# Patient Record
Sex: FEMALE | Race: WHITE
[De-identification: names, ages, dates, MRNs, and addresses within clinical notes are randomized per-mention and may not be internally consistent; named-entity substitution may affect disease eponyms.]

---

## 2017-03-30 ENCOUNTER — HOSPITAL ENCOUNTER (EMERGENCY)
Dept: HOSPITAL 47 - EC | Age: 22
Discharge: HOME | End: 2017-03-30
Payer: COMMERCIAL

## 2017-03-30 VITALS
HEART RATE: 84 BPM | RESPIRATION RATE: 18 BRPM | DIASTOLIC BLOOD PRESSURE: 64 MMHG | SYSTOLIC BLOOD PRESSURE: 122 MMHG | TEMPERATURE: 97 F

## 2017-03-30 DIAGNOSIS — Z88.5: ICD-10-CM

## 2017-03-30 DIAGNOSIS — Z3A.00: ICD-10-CM

## 2017-03-30 DIAGNOSIS — O23.40: Primary | ICD-10-CM

## 2017-03-30 DIAGNOSIS — F17.200: ICD-10-CM

## 2017-03-30 DIAGNOSIS — O99.89: ICD-10-CM

## 2017-03-30 DIAGNOSIS — R11.0: ICD-10-CM

## 2017-03-30 DIAGNOSIS — Z88.0: ICD-10-CM

## 2017-03-30 DIAGNOSIS — N89.8: ICD-10-CM

## 2017-03-30 DIAGNOSIS — O99.330: ICD-10-CM

## 2017-03-30 LAB
PARTICLE COUNT: (no result)
PH UR: 5.5 [PH] (ref 5–8)
PROT UR QL: (no result)
RBC UR QL: 3 /HPF (ref 0–5)
SP GR UR: 1.02 (ref 1–1.03)
SQUAMOUS UR QL AUTO: 101 /HPF (ref 0–4)
UA BILLING (MACRO VS. MICRO): (no result)
UROBILINOGEN UR QL STRIP: <2 MG/DL (ref ?–2)
WBC #/AREA URNS HPF: 176 /HPF (ref 0–5)

## 2017-03-30 PROCEDURE — 87205 SMEAR GRAM STAIN: CPT

## 2017-03-30 PROCEDURE — 87070 CULTURE OTHR SPECIMN AEROBIC: CPT

## 2017-03-30 PROCEDURE — 81001 URINALYSIS AUTO W/SCOPE: CPT

## 2017-03-30 PROCEDURE — 87086 URINE CULTURE/COLONY COUNT: CPT

## 2017-03-30 PROCEDURE — 87591 N.GONORRHOEAE DNA AMP PROB: CPT

## 2017-03-30 PROCEDURE — 87808 TRICHOMONAS ASSAY W/OPTIC: CPT

## 2017-03-30 PROCEDURE — 99284 EMERGENCY DEPT VISIT MOD MDM: CPT

## 2017-03-30 PROCEDURE — 86901 BLOOD TYPING SEROLOGIC RH(D): CPT

## 2017-03-30 PROCEDURE — 84702 CHORIONIC GONADOTROPIN TEST: CPT

## 2017-03-30 PROCEDURE — 87491 CHLMYD TRACH DNA AMP PROBE: CPT

## 2017-03-30 PROCEDURE — 36415 COLL VENOUS BLD VENIPUNCTURE: CPT

## 2017-03-30 PROCEDURE — 81025 URINE PREGNANCY TEST: CPT

## 2017-03-30 PROCEDURE — 86900 BLOOD TYPING SEROLOGIC ABO: CPT

## 2017-03-30 NOTE — ED
Female Urogenital HPI





- General


Chief complaint: Vaginal Bleeding


Stated complaint: pregnant-spotting


Time Seen by Provider: 03/30/17 01:43


Source: patient, RN notes reviewed


Mode of arrival: ambulatory


Limitations: no limitations





- History of Present Illness


Initial comments: 





Patient is 21-year-old female chief complaint of possibly being pregnant.  

Patient reports that over the past increasing nausea in the morning as well as 

tenderness to bilateral breasts.  Patient reports that she's not had a 

menstrual cycle since November.  Patient states that she has a history of 

abnormal.  Said this is a surprise to her.  She states that she is unsure of 

her lung pregnant she could be.  She denies any abdominal pain.  She states 

that today she did notice spotting and is concerned.  Patient states she has 

not taken a pregnancy test.  Patient states the severe first pregnancy.


Last Menstrual Period: 11/30/16





- Related Data


 Previous Rx's











 Medication  Instructions  Recorded


 


Cefpodoxime Proxetil [Vantin] 100 mg PO Q12HR #14 tab 03/30/17


 


Prenatal Vit-Iron-Folic Acid 1 cap PO DAILY #30 cap 03/30/17





[Prenatal-U Capsule (formulary)]  











 Allergies











Allergy/AdvReac Type Severity Reaction Status Date / Time


 


codeine Allergy  Anaphylaxis Verified 03/30/17 01:44


 


Penicillins Allergy  Rash/Hives Verified 03/30/17 01:44














Review of Systems


ROS Statement: 


Those systems with pertinent positive or pertinent negative responses have been 

documented in the HPI.





ROS Other: All systems not noted in ROS Statement are negative.





Past Medical History


Additional Past Medical History / Comment(s): endometreosis


History of Any Multi-Drug Resistant Organisms: None Reported


Past Surgical History: Hernia Repair


Past Psychological History: Anxiety, Bipolar


Smoking Status: Current every day smoker


Past Alcohol Use History: None Reported


Past Drug Use History: None Reported





General Exam





- General Exam Comments


Initial Comments: 





Pleasant 21-year-old female.  No distress.


Limitations: no limitations


General appearance: alert, in no apparent distress


Head exam: Present: atraumatic, normocephalic, normal inspection


Eye exam: Present: normal appearance, PERRL, EOMI.  Absent: scleral icterus, 

conjunctival injection, periorbital swelling


ENT exam: Present: normal exam, mucous membranes moist


Neck exam: Present: normal inspection.  Absent: tenderness, meningismus, 

lymphadenopathy


Respiratory exam: Present: normal lung sounds bilaterally.  Absent: respiratory 

distress, wheezes, rales, rhonchi, stridor


Cardiovascular Exam: Present: regular rate, normal rhythm, normal heart sounds.

  Absent: systolic murmur, diastolic murmur, rubs, gallop, clicks


GI/Abdominal exam: Present: soft, normal bowel sounds.  Absent: distended, 

tenderness, guarding, rebound, rigid


External exam: Present: normal external exam


Speculum exam: Present: normal speculum exam, vaginal discharge (Skin vaginal 

discharge.)


By manual exam: Present: normal by manual exam


Extremities exam: Present: normal inspection, full ROM, normal capillary 

refill.  Absent: tenderness, pedal edema, joint swelling, calf tenderness


Back exam: Present: normal inspection


Neurological exam: Present: alert, oriented X3, CN II-XII intact





Course


 Vital Signs











  03/30/17 03/30/17





  01:39 03:33


 


Temperature 97.4 F L 97 F L


 


Pulse Rate 74 84


 


Respiratory 20 18





Rate  


 


Blood Pressure 120/78 122/64


 


O2 Sat by Pulse 99 97





Oximetry  














Medical Decision Making





- Medical Decision Making





Patient is 21-year-old female the possibility of being pregnant.  Last 

menstrual cycle was in November.  Patient does test positive on the urine 

pregnancy test.  Also signs of infection.  Patient be treated with Ceftin 

piroxicam.  She is ALLERGIC to penicillins.  Patient is also O+.  No signs of 

miscarriage with any vaginal bleeding.  There is scant amount of vaginal 

discharge.  Swabs are obtained for chlamydia and gonorrhea.  Patient is excited 

about the pregnancy.  She states that she plans to get an OB/GYN.  At this time 

currently pending hCG Quant results.  Patient will be discharged with prenatal 

vitamin, antibiotic, and prescription for transvaginal ultrasound.  Patient 

agrees with the treatment plan will comply.  I discussed close follow-up with 

primary care provider.  Return parameters were discussed.





- Lab Data


 Lab Results











  03/30/17 03/30/17 03/30/17 Range/Units





  01:50 01:50 02:35 


 


Urine Color   Yellow   


 


Urine Appearance   Turbid H   (Clear)  


 


Urine pH   5.5   (5.0-8.0)  


 


Ur Specific Gravity   1.023   (1.001-1.035)  


 


Urine Protein   1+ H   (Negative)  


 


Urine Glucose (UA)   Negative   (Negative)  


 


Urine Ketones   Negative   (Negative)  


 


Urine Blood   Moderate H   (Negative)  


 


Urine Nitrite   Negative   (Negative)  


 


Urine Bilirubin   Negative   (Negative)  


 


Urine Urobilinogen   <2.0   (<2.0)  mg/dL


 


Ur Leukocyte Esterase   Large H   (Negative)  


 


Urine RBC   3   (0-5)  /hpf


 


Urine WBC   176 H   (0-5)  /hpf


 


Ur Squamous Epith Cells   101 H   (0-4)  /hpf


 


Urine Mucus   Many H   (None)  /hpf


 


Urine HCG, Qual  Detected    (Not Detectd)  


 


Trichomonas Ag (Rapid)    Negative  (Negative)  


 


Blood Type     


 


Blood Type Recheck     














  03/30/17 Range/Units





  03:30 


 


Urine Color   


 


Urine Appearance   (Clear)  


 


Urine pH   (5.0-8.0)  


 


Ur Specific Gravity   (1.001-1.035)  


 


Urine Protein   (Negative)  


 


Urine Glucose (UA)   (Negative)  


 


Urine Ketones   (Negative)  


 


Urine Blood   (Negative)  


 


Urine Nitrite   (Negative)  


 


Urine Bilirubin   (Negative)  


 


Urine Urobilinogen   (<2.0)  mg/dL


 


Ur Leukocyte Esterase   (Negative)  


 


Urine RBC   (0-5)  /hpf


 


Urine WBC   (0-5)  /hpf


 


Ur Squamous Epith Cells   (0-4)  /hpf


 


Urine Mucus   (None)  /hpf


 


Urine HCG, Qual   (Not Detectd)  


 


Trichomonas Ag (Rapid)   (Negative)  


 


Blood Type  O Positive  


 


Blood Type Recheck  No  














Disposition


Clinical Impression: 


 Pregnant, Urinary tract infection





Disposition: HOME SELF-CARE


Condition: Good


Instructions:  Pregnancy (ED), Urinary Tract Infection in Pregnancy (ED)


Additional Instructions: 


Patient denies to complete antibiotics.  Follow-up with a OB/GYN.  Completely 

ultrasound.  Return to emergency department if any alarming signs or symptoms 

occur.


Prescriptions: 


Cefpodoxime Proxetil [Vantin] 100 mg PO Q12HR #14 tab


Prenatal Vit-Iron-Folic Acid [Prenatal-U Capsule (formulary)] 1 cap PO DAILY #

30 cap


Referrals: 


Alejandro Richardson MD [Primary Care Provider] - 1-2 days


Helen Villalobos MD [STAFF PHYSICIAN] - 1-2 days


Time of Disposition: 03:11

## 2017-08-12 ENCOUNTER — HOSPITAL ENCOUNTER (OUTPATIENT)
Dept: HOSPITAL 47 - FBPOP | Age: 22
Discharge: HOME | End: 2017-08-12
Payer: COMMERCIAL

## 2017-08-12 VITALS
RESPIRATION RATE: 20 BRPM | TEMPERATURE: 97.1 F | SYSTOLIC BLOOD PRESSURE: 118 MMHG | DIASTOLIC BLOOD PRESSURE: 71 MMHG | HEART RATE: 82 BPM

## 2017-08-12 DIAGNOSIS — O21.9: Primary | ICD-10-CM

## 2017-08-12 DIAGNOSIS — Z3A.26: ICD-10-CM

## 2017-08-12 LAB
PARTICLE COUNT: (no result)
PH UR: 6.5 [PH] (ref 5–8)
RBC UR QL: 4 /HPF (ref 0–5)
SP GR UR: 1.01 (ref 1–1.03)
SQUAMOUS UR QL AUTO: 27 /HPF (ref 0–4)
UA BILLING (MACRO VS. MICRO): (no result)
UROBILINOGEN UR QL STRIP: <2 MG/DL (ref ?–2)
WBC #/AREA URNS HPF: 13 /HPF (ref 0–5)

## 2017-08-12 PROCEDURE — 81001 URINALYSIS AUTO W/SCOPE: CPT

## 2017-08-12 PROCEDURE — 96375 TX/PRO/DX INJ NEW DRUG ADDON: CPT

## 2017-08-12 PROCEDURE — 96360 HYDRATION IV INFUSION INIT: CPT

## 2017-08-12 PROCEDURE — 99214 OFFICE O/P EST MOD 30 MIN: CPT

## 2017-08-12 PROCEDURE — 96361 HYDRATE IV INFUSION ADD-ON: CPT

## 2017-08-12 RX ADMIN — POTASSIUM CHLORIDE SCH MLS/HR: 14.9 INJECTION, SOLUTION INTRAVENOUS at 11:26

## 2017-08-12 RX ADMIN — POTASSIUM CHLORIDE SCH MLS/HR: 14.9 INJECTION, SOLUTION INTRAVENOUS at 10:50

## 2017-10-28 NOTE — P.MSEPDOC
Presenting Problems





- Arrival Data


Date of Arrival on Unit: 17


Time of Arrival on Unit: 09:39


Mode of Transport: Ambulatory





- Complaint


OB-Reason for Admission/Chief Complaint: Acute Nausea/Vomiting


Comment: n/v x 7 since midnight.





Prenatal Medical History





- Pregnancy Information


: 1


Para: 0


Term: 0


: 0


Abortions: Spontaneous or Elective: 0


Number of Living Children: 0





- Gestational Age


Gestational Age by AIDAN (wks/days): 26 Weeks and 3 Days





Review of Systems





- Review of Systems


Constitutional: No problems


Breast: No problems


ENT: No problems


Cardiovascular: No problems


Respiratory: No problems


Genitourinary: No problems


Musculoskeletal: No problems


Neurological: No problems


Skin: No problems


Comment: n/v since mn





Vital Signs





- Temperature


Temperature: 97.1 F


Temperature Source: Oral





- Pulse


  ** Right Brachial


Pulse Rate: 82


Pulse Assessment Method: Automatic Cuff





- Respirations


Respiratory Rate: 20


Oxygen Delivery Method: Room Air


O2 Sat by Pulse Oximetry: 98





- Blood Pressure


  ** Right Arm


Blood Pressure: 118/71


Blood Pressure Mean: 86


Blood Pressure Source: Automatic Cuff





Medical Screen Scoring (Pre)





- Cervical Exam


Dilation: Exam Deferred


Effacement: Exam Deferred


Membranes: Intact





- Uterine Contractions


Frequency: N/A


Duration: N/A


Intensity: N/A





- Maternal Vital Signs


Maternal Temperature: N/A


Maternal Blood Pressure: N/A


Signs of Preeclampsia: N/A


Maternal Respirations: N/A





- Fetal Assessment


Baseline FHR: 130


Fetal Heart Rate - NICHD Category: Category I (Normal) = 0


Fetal Position: N/A


Fetal Station: N/A





- Total Score


Total Score (Pre): 0





- Level of Risk


Level of Risk: Low (0-5)





Physician Notification (Pre)





- Physician Notified


Spoke With: kaye


New Order Received: Yes





- Notification Comment


Comment: ok to discharge patient if no nausea or vomitting and pt is stable for 

discharge.





Medical Screen Scoring (Post)





- Cervical Exam


Dilation: Exam Deferred


Effacement: Exam Deferred





- Uterine Contractions


Frequency: N/A


Duration: N/A


Intensity: N/A





- Maternal Vital Signs


Maternal Temperature: N/A


Maternal Blood Pressure: N/A


Signs of Preeclampsia: N/A


Maternal Respirations: N/A





- Maternal Trauma


Maternal Trauma: N/A





- Fetal Assessment


Fetal Heart Rate: 130


Fetal Heart Rate - NICHD Category: Category I (Normal) = 0


NST: Reactive


Fetal Position: N/A


Fetal Station: N/A





- Total Score


Total Score (Post): 0





- Post Treatment Level of Risk


Post Treatment Level of Risk: Low (0-5)





Physician Notification (Post)





- Physician Notified


Physician Notified Date: 17


Physician Notified Time: 10:40


Physician/Practitioner Notified:: kaye


Spoke With: kaye


New Order Received: No





- Notification Comment


Comment: disch home.





Disposition





- Disposition


OB Disposition: Discharge to home


Discharge Date: 17


Discharge Time: 13:40


I agree with the RN Medical Screening Exam: Yes


Risk & Benefit of care provided described in d/c instruction: Yes


Diagnosis: VOMITING OF PREGNANCY, UNSPECIFIED

## 2017-10-30 ENCOUNTER — HOSPITAL ENCOUNTER (OUTPATIENT)
Dept: HOSPITAL 47 - FBPOP | Age: 22
Discharge: HOME | End: 2017-10-30
Payer: COMMERCIAL

## 2017-10-30 VITALS
TEMPERATURE: 98.6 F | SYSTOLIC BLOOD PRESSURE: 129 MMHG | RESPIRATION RATE: 16 BRPM | DIASTOLIC BLOOD PRESSURE: 74 MMHG | HEART RATE: 88 BPM

## 2017-10-30 DIAGNOSIS — O47.03: Primary | ICD-10-CM

## 2017-10-30 DIAGNOSIS — Z3A.37: ICD-10-CM

## 2017-10-30 PROCEDURE — 99214 OFFICE O/P EST MOD 30 MIN: CPT

## 2017-10-30 PROCEDURE — 96360 HYDRATION IV INFUSION INIT: CPT

## 2017-10-30 PROCEDURE — 96361 HYDRATE IV INFUSION ADD-ON: CPT

## 2017-10-30 PROCEDURE — 59025 FETAL NON-STRESS TEST: CPT

## 2017-11-07 ENCOUNTER — HOSPITAL ENCOUNTER (INPATIENT)
Dept: HOSPITAL 47 - FBPOP | Age: 22
LOS: 5 days | Discharge: HOME | DRG: 540 | End: 2017-11-12
Payer: COMMERCIAL

## 2017-11-07 VITALS — BODY MASS INDEX: 24.4 KG/M2

## 2017-11-07 DIAGNOSIS — Z3A.38: ICD-10-CM

## 2017-11-07 DIAGNOSIS — K56.7: ICD-10-CM

## 2017-11-07 DIAGNOSIS — Z87.891: ICD-10-CM

## 2017-11-07 DIAGNOSIS — Z88.5: ICD-10-CM

## 2017-11-07 DIAGNOSIS — Z88.0: ICD-10-CM

## 2017-11-07 LAB
BASOPHILS # BLD AUTO: 0 K/UL (ref 0–0.2)
BASOPHILS NFR BLD AUTO: 0 %
CH: 29.2
CHCM: 31.8
EOSINOPHIL # BLD AUTO: 0 K/UL (ref 0–0.7)
EOSINOPHIL NFR BLD AUTO: 0 %
ERYTHROCYTE [DISTWIDTH] IN BLOOD BY AUTOMATED COUNT: 3.64 M/UL (ref 3.8–5.4)
ERYTHROCYTE [DISTWIDTH] IN BLOOD: 14.1 % (ref 11.5–15.5)
HCT VFR BLD AUTO: 33.6 % (ref 34–46)
HDW: 2.83
HGB BLD-MCNC: 10.7 GM/DL (ref 11.4–16)
LUC NFR BLD AUTO: 1 %
LYMPHOCYTES # SPEC AUTO: 1.4 K/UL (ref 1–4.8)
LYMPHOCYTES NFR SPEC AUTO: 8 %
MCH RBC QN AUTO: 29.3 PG (ref 25–35)
MCHC RBC AUTO-ENTMCNC: 31.7 G/DL (ref 31–37)
MCV RBC AUTO: 92.4 FL (ref 80–100)
MONOCYTES # BLD AUTO: 1 K/UL (ref 0–1)
MONOCYTES NFR BLD AUTO: 5 %
NEUTROPHILS # BLD AUTO: 14.9 K/UL (ref 1.3–7.7)
NEUTROPHILS NFR BLD AUTO: 85 %
PCO2 BLDCOA: 61 MMHG
WBC # BLD AUTO: 0.18 10*3/UL
WBC # BLD AUTO: 17.6 K/UL (ref 4–11)
WBC (PEROX): 18.71

## 2017-11-07 PROCEDURE — 88307 TISSUE EXAM BY PATHOLOGIST: CPT

## 2017-11-07 PROCEDURE — 86850 RBC ANTIBODY SCREEN: CPT

## 2017-11-07 PROCEDURE — 86900 BLOOD TYPING SEROLOGIC ABO: CPT

## 2017-11-07 PROCEDURE — 82803 BLOOD GASES ANY COMBINATION: CPT

## 2017-11-07 PROCEDURE — 74000: CPT

## 2017-11-07 PROCEDURE — 85025 COMPLETE CBC W/AUTO DIFF WBC: CPT

## 2017-11-07 PROCEDURE — 86901 BLOOD TYPING SEROLOGIC RH(D): CPT

## 2017-11-07 RX ADMIN — POTASSIUM CHLORIDE SCH MLS/HR: 14.9 INJECTION, SOLUTION INTRAVENOUS at 19:16

## 2017-11-07 RX ADMIN — ACETAMINOPHEN SCH MLS/HR: 10 INJECTION, SOLUTION INTRAVENOUS at 21:20

## 2017-11-07 RX ADMIN — ACETAMINOPHEN SCH MLS/HR: 10 INJECTION, SOLUTION INTRAVENOUS at 13:18

## 2017-11-07 RX ADMIN — POTASSIUM CHLORIDE SCH MLS/HR: 14.9 INJECTION, SOLUTION INTRAVENOUS at 10:04

## 2017-11-07 RX ADMIN — DOCUSATE SODIUM AND SENNOSIDES SCH: 50; 8.6 TABLET ORAL at 22:13

## 2017-11-07 NOTE — P.OP
Date of Procedure: 17


Preoperative Diagnosis: 


Nonreassuring fetal heart tones, IUP at 38 6/7 weeks


Postoperative Diagnosis: 


Same


Procedure(s) Performed: 


Primary low transverse  section


Anesthesia: WESLY


Surgeon: Sharlene Fuller


Assistant #1: Binta Moreira


Estimated Blood Loss (ml): 800


IV fluids (ml): 1,000


Urine output (ml): 100


Pathology: other (Placenta)


Condition: stable


Disposition: observation


Indications for Procedure: 


Nonreassuring fetal heart tones


Operative Findings: 


Thick meconium-stained amniotic fluid.  Female infant delivered in vertex 

presentation at 851 Apgars of 389 at 15 and 10 minutes respectively.  Birth 

weight of 7 lbs. 10 oz.


Description of Procedure: 


The patient was prepped and draped in the usual fashion after spinal anesthesia 

was administered by Dr. Nogueira.  A Pfannenstiel incision was made and extended 

of the abdominal cavity without difficulty.  The bladder peritoneum was 

elevated and incised and reflected distally.  A 2 cm incision was made in the 

transverse plane of the lower uterine segment to enter the uterus at which time 

clear fluid was noted.  The incision was extended in both directions using the 

bandage scissors.  The fetal head was encountered within the field and 

delivered up and through the incision where the nose and mouth were thoroughly 

suctioned.  Remainder of the infant was delivered onto the surgical field where 

the cord was doubly clamped, cut, and the infant was passed for resuscitative 

measures with weight and Apgars 3, 8,9 1,5 10 mins.  A segment of cord was then 

doubly clamped, cut, and set aside should cord gases become necessary.  The 

placenta was delivered manually, intact, and was grossly normal with a grossly 

normal three-vessel cord.  The uterus was exteriorized and the interior cavity 

of the uterus swept of any remaining placental and membranous fragments with a 

laparotomy sponge.  The margins of the incision were grasped with allis clamps 

and the incision closed in 2 layers.  First layer was a running locking layer 

of 0 vicryl from margin to margin followed by a second layer of imbricating 0 

vicryl from margin to margin.  Any small points of bleeding were then made 

hemostatic with the Bovie.  Once hemostasis was achieved, the posterior cul-de-

sac was suctioned with a guard and the uterine and ovarian findings are as 

noted above.  The uterus was replaced within the abdominal cavity and the 

gutters swept of any remaining blood fluid or clot.  The incision was again 

reexamined and hemostasis was noted to be excellent.  Any small point of 

bleeding were made hemostatic with the Bovie.  Once hemostasis was achieved the 

parietal peritoneum was loosely reapproximated.  The layer of muscles were 

examined and made hemostatic with the Bovie.  Attention was then turned to the 

fascia which was closed with 2 running stitches of 0 Vicryl proceeding from the 

lateral margins to the midpoint.  The subcutaneous tissues were irrigated, made 

hemostatic with the Bovie, and reapproximated with a running stitch of 30 plain 

catgut.  The skin was reapproximated with regular surgical staples.  Estimated 

blood loss for the case was approximately 600 mL.  All sponge instrument and 

needle counts are correct.  There were no complications.  The patient tolerated 

the procedure well and proceeded to the recovery room in stable condition.  

Both mother and infant are resting comfortably in recovery.

## 2017-11-07 NOTE — P.HPOB
History of Present Illness


H&P Date: 17


Chief Complaint: Contractions, IUP at 38-6/7 weeks





Since a very pleasant 21-year-old  1 para 0 at 38-6/7 weeks that 

presented to labor and delivery with complaints of contractions.  On initial 

exam patient was 3 cm dilated but noted fetal tachycardia with decelerations 

down to the 60s.  Patient stated she was uncomfortable with contractions.  She 

denied any loss of fluid at this time.  She denied any vaginal bleeding.  She 

states she started rajiv in the middle of the evening but they really got 

strongly around 1:00 this morning.  She did fetal movement throughout all of 

the evening.  The decision was made to take the patient back for a primary low 

transverse  section secondary to nonreassuring fetal heart tones 

informed consent was obtained at the bedside from the patient.  Risks were 

reviewed including but not limited to infection, bleeding, damage to bladder, 

bowel, ureteric injury.  She states understanding and will sign consent.


On prenatal blood work she had a blood type of O+, rubella immune, RPR 

nonreactive, hepatitis B surface antigen negative, HIV negative group B beta 

strep positive.





Past Medical History


Additional Past Medical History / Comment(s): endometreosis


History of Any Multi-Drug Resistant Organisms: None Reported


Past Surgical History: Hernia Repair


Past Anesthesia/Blood Transfusion Reactions: No Reported Reaction


Past Psychological History: Anxiety, Bipolar


Smoking Status: Never smoker


Past Alcohol Use History: None Reported


Past Drug Use History: Marijuana


Additional Drug Use History / Comment(s): pt states discontinued use but family 

she lives with still smokes





- Past Family History


  ** Mother


Family Medical History: Blood Disorder





Medications and Allergies


 Home Medications











 Medication  Instructions  Recorded  Confirmed  Type


 


Prenatal Vit-Iron-Folic Acid 1 cap PO DAILY #30 cap 17 Rx





[Prenatal-U Capsule (formulary)]    











 Allergies











Allergy/AdvReac Type Severity Reaction Status Date / Time


 


codeine Allergy  Anaphylaxis Verified 17 09:20


 


Penicillins Allergy  Rash/Hives Verified 17 09:20














Exam


Osteopathic Statement: *.  No significant issues noted on an osteopathic 

structural exam other than those noted in the History and Physical/Consult.





- Vital Signs


Vital signs: 


 Vital Signs











  Temp Pulse Resp BP Pulse Ox


 


 17 12:48  98.9 F  107 H  18  142/81  99


 


 17 11:31   95  16  139/82  100


 


 11/07/17 11:01   92  16  135/82  100


 


 17 10:31   85  16  131/80  100


 


 17 10:16   77  16  140/82  100


 


 17 10:01   85  16  145/78  100


 


 17 09:46   86  16  139/84  100


 


 17 09:31  98.9 F  108 H  16  146/81  100


 


 17 09:10  97.9 F  102 H  18  138/88  100








 Intake and Output











 17





 06:59 14:59 22:59


 


Output Total  800 


 


Balance  -800 


 


Output:   


 


  Estimated Blood Loss  800 


 


Other:   


 


  Weight  62.596 kg 








 Patient Weight











 17





 06:59


 


Weight 62.596 kg














Results


Result Diagrams: 


 17 08:40





 Abnormal Lab Results - Last 24 Hours (Table)











  17 Range/Units





  08:40 


 


WBC  17.6 H  (4.0-11.0)  k/uL


 


RBC  3.64 L  (3.80-5.40)  m/uL


 


Hgb  10.7 L  (11.4-16.0)  gm/dL


 


Hct  33.6 L  (34.0-46.0)  %


 


Neutrophils #  14.9 H  (1.3-7.7)  k/uL














Assessment and Plan


(1) Term pregnancy


Current Visit: Yes   Status: Acute   Code(s): Z34.80 - ENCOUNTER FOR SUPRVSN OF 

NORMAL PREGNANCY, UNSP TRIMESTER   SNOMED Code(s): 02252971


   





(2) Non-reassuring electronic fetal monitoring tracing


Narrative/Plan: 


Plan was for primary low transverse  section with general anesthesia 

secondary to nonreassuring fetal heart tracing.


Current Visit: Yes   Status: Acute   Code(s): O76 - ABNLT IN FETAL HEART RATE 

AND RHYTHM COMP LABOR AND DELIVERY   SNOMED Code(s): 724655893

## 2017-11-08 LAB
BASOPHILS # BLD AUTO: 0 K/UL (ref 0–0.2)
BASOPHILS NFR BLD AUTO: 0 %
CH: 29.8
CHCM: 31.5
EOSINOPHIL # BLD AUTO: 0 K/UL (ref 0–0.7)
EOSINOPHIL NFR BLD AUTO: 0 %
ERYTHROCYTE [DISTWIDTH] IN BLOOD BY AUTOMATED COUNT: 3.02 M/UL (ref 3.8–5.4)
ERYTHROCYTE [DISTWIDTH] IN BLOOD: 13.3 % (ref 11.5–15.5)
HCT VFR BLD AUTO: 28.8 % (ref 34–46)
HDW: 2.87
HGB BLD-MCNC: 8.7 GM/DL (ref 11.4–16)
LUC NFR BLD AUTO: 1 %
LYMPHOCYTES # SPEC AUTO: 0.7 K/UL (ref 1–4.8)
LYMPHOCYTES NFR SPEC AUTO: 3 %
MCH RBC QN AUTO: 28.7 PG (ref 25–35)
MCHC RBC AUTO-ENTMCNC: 30.1 G/DL (ref 31–37)
MCV RBC AUTO: 95.4 FL (ref 80–100)
MONOCYTES # BLD AUTO: 0.7 K/UL (ref 0–1)
MONOCYTES NFR BLD AUTO: 3 %
NEUTROPHILS # BLD AUTO: 25.4 K/UL (ref 1.3–7.7)
NEUTROPHILS NFR BLD AUTO: 94 %
WBC # BLD AUTO: 0.16 10*3/UL
WBC # BLD AUTO: 27 K/UL (ref 4–11)
WBC (PEROX): 27.66

## 2017-11-08 RX ADMIN — TRAMADOL HYDROCHLORIDE AND ACETAMINOPHEN PRN EACH: 37.5; 325 TABLET, FILM COATED ORAL at 11:56

## 2017-11-08 RX ADMIN — DOCUSATE SODIUM AND SENNOSIDES SCH EACH: 50; 8.6 TABLET ORAL at 07:42

## 2017-11-08 RX ADMIN — ACETAMINOPHEN SCH: 10 INJECTION, SOLUTION INTRAVENOUS at 09:52

## 2017-11-08 RX ADMIN — DOCUSATE SODIUM AND SENNOSIDES SCH EACH: 50; 8.6 TABLET ORAL at 20:57

## 2017-11-08 RX ADMIN — POTASSIUM CHLORIDE SCH: 14.9 INJECTION, SOLUTION INTRAVENOUS at 07:41

## 2017-11-08 RX ADMIN — IBUPROFEN PRN MG: 600 TABLET ORAL at 16:20

## 2017-11-08 RX ADMIN — ACETAMINOPHEN SCH MLS/HR: 10 INJECTION, SOLUTION INTRAVENOUS at 03:45

## 2017-11-08 RX ADMIN — IBUPROFEN PRN MG: 600 TABLET ORAL at 07:42

## 2017-11-08 RX ADMIN — PRENATAL W/O A VIT W/ FE FUMARATE-FA CAP 106.5-1 MG SCH: 106.5 CAPSULE ORAL at 13:07

## 2017-11-08 RX ADMIN — TRAMADOL HYDROCHLORIDE AND ACETAMINOPHEN PRN EACH: 37.5; 325 TABLET, FILM COATED ORAL at 20:56

## 2017-11-08 NOTE — P.PNOBGPC
Subjective





- Subjective


Principal diagnosis: Postop day 1, primary 


Interval history: 





Azra has done well status post primary  for nonreassuring fetal 

heart tones.  She is ambulating.  She has voided on her own status post Colby 

catheter discontinuation.  She states her pain is controlled.  We are 

struggling a little bit to find what we can use for her given her severe 

codeine ALLERGY.  We will try Ultram this morning.  In addition we will give 

her IV Ofirmev if needed.


Patient reports: Reports appetite normal, Reports voiding normally, Reports 

pain well controlled, Reports ambulating normally


: doing well (In the nursery.)





Objective





- Vital Signs


Latest vital signs: 


 Vital Signs











  Temp Pulse Resp BP Pulse Ox


 


 17 04:00  98.8 F  86  16  108/59  98


 


 17 00:00  98.4 F  75  18  120/68  100


 


 17 20:00  98.1 F  80  18  116/70  100


 


 17 16:00  98 F  92  16  122/67  97


 


 17 13:48   94  16  121/68  97


 


 17 13:33   113 H  16  133/75  96


 


 17 13:18   114 H  16  136/83  97


 


 17 13:03   114 H  16  151/92  100


 


 17 12:48  98.9 F  107 H  18  142/81  99


 


 17 11:31   95  16  139/82  100


 


 17 11:01   92  16  135/82  100


 


 17 10:31   85  16  131/80  100


 


 17 10:16   77  16  140/82  100


 


 17 10:01   85  16  145/78  100


 


 17 09:46   86  16  139/84  100


 


 17 09:31  98.9 F  108 H  16  146/81  100


 


 17 09:10  97.9 F  102 H  18  138/88  100








 Intake and Output











 17





 22:59 06:59 14:59


 


Intake Total 125  


 


Output Total 2400  


 


Balance -2275  


 


Intake:   


 


    


 


    Invasive Line 1 125  


 


Output:   


 


  Urine 2400  


 


Other:   


 


  # Voids  1 














- Labs


Labs: 


 Abnormal Lab Results - Last 24 Hours (Table)











  11/07/17 Range/Units





  08:40 


 


WBC  17.6 H  (4.0-11.0)  k/uL


 


RBC  3.64 L  (3.80-5.40)  m/uL


 


Hgb  10.7 L  (11.4-16.0)  gm/dL


 


Hct  33.6 L  (34.0-46.0)  %


 


Neutrophils #  14.9 H  (1.3-7.7)  k/uL














Assessment and Plan


(1) Term pregnancy


Current Visit: Yes   Status: Acute   Code(s): Z34.80 - ENCOUNTER FOR SUPRVSN OF 

NORMAL PREGNANCY, UNSP TRIMESTER   SNOMED Code(s): 75719911


   





(2) Non-reassuring electronic fetal monitoring tracing


Current Visit: Yes   Status: Acute   Code(s): O76 - ABNLT IN FETAL HEART RATE 

AND RHYTHM COMP LABOR AND DELIVERY   SNOMED Code(s): 624450241

## 2017-11-09 LAB
BASOPHILS # BLD AUTO: 0 K/UL (ref 0–0.2)
BASOPHILS NFR BLD AUTO: 0 %
CH: 28.6
CHCM: 31
EOSINOPHIL # BLD AUTO: 0 K/UL (ref 0–0.7)
EOSINOPHIL NFR BLD AUTO: 0 %
ERYTHROCYTE [DISTWIDTH] IN BLOOD BY AUTOMATED COUNT: 2.92 M/UL (ref 3.8–5.4)
ERYTHROCYTE [DISTWIDTH] IN BLOOD: 14.1 % (ref 11.5–15.5)
HCT VFR BLD AUTO: 27.2 % (ref 34–46)
HDW: 2.77
HGB BLD-MCNC: 8.5 GM/DL (ref 11.4–16)
LUC NFR BLD AUTO: 1 %
LYMPHOCYTES # SPEC AUTO: 0.9 K/UL (ref 1–4.8)
LYMPHOCYTES NFR SPEC AUTO: 4 %
MCH RBC QN AUTO: 29 PG (ref 25–35)
MCHC RBC AUTO-ENTMCNC: 31.2 G/DL (ref 31–37)
MCV RBC AUTO: 93 FL (ref 80–100)
MONOCYTES # BLD AUTO: 0.4 K/UL (ref 0–1)
MONOCYTES NFR BLD AUTO: 2 %
NEUTROPHILS # BLD AUTO: 23.2 K/UL (ref 1.3–7.7)
NEUTROPHILS NFR BLD AUTO: 94 %
WBC # BLD AUTO: 0.16 10*3/UL
WBC # BLD AUTO: 24.7 K/UL (ref 4–11)
WBC (PEROX): 26.33

## 2017-11-09 RX ADMIN — IBUPROFEN PRN MG: 600 TABLET ORAL at 19:35

## 2017-11-09 RX ADMIN — ACETAMINOPHEN PRN MLS/HR: 10 INJECTION, SOLUTION INTRAVENOUS at 23:30

## 2017-11-09 RX ADMIN — DIMETHICONE SCH: 80 TABLET, CHEWABLE ORAL at 23:51

## 2017-11-09 RX ADMIN — DOCUSATE SODIUM AND SENNOSIDES SCH EACH: 50; 8.6 TABLET ORAL at 09:07

## 2017-11-09 RX ADMIN — PRENATAL W/O A VIT W/ FE FUMARATE-FA CAP 106.5-1 MG SCH EACH: 106.5 CAPSULE ORAL at 09:03

## 2017-11-09 RX ADMIN — POTASSIUM CHLORIDE SCH MLS/HR: 14.9 INJECTION, SOLUTION INTRAVENOUS at 23:30

## 2017-11-09 RX ADMIN — DIMETHICONE SCH MG: 80 TABLET, CHEWABLE ORAL at 09:03

## 2017-11-09 RX ADMIN — DIMETHICONE SCH MG: 80 TABLET, CHEWABLE ORAL at 16:34

## 2017-11-09 RX ADMIN — DOCUSATE SODIUM AND SENNOSIDES SCH EACH: 50; 8.6 TABLET ORAL at 19:36

## 2017-11-09 RX ADMIN — TRAMADOL HYDROCHLORIDE AND ACETAMINOPHEN PRN EACH: 37.5; 325 TABLET, FILM COATED ORAL at 16:35

## 2017-11-09 RX ADMIN — IBUPROFEN PRN MG: 600 TABLET ORAL at 10:44

## 2017-11-09 NOTE — P.PNOBGPC
Subjective





- Subjective


Principal diagnosis: Postop day 2, primary 


Interval history: 





Patient is doing well this morning.  She slept well overnight.  Baby remains in 

the nursery with oxygen.  She is using Ultram for pain control due to her many 

ALLERGIES.  She states it is working but she has painful this morning.  She is 

ambulating and voiding without difficulty.  She has not passed flatus.  She is 

tolerating a regular diet without nausea or vomiting.


Patient reports: Reports appetite normal, Reports voiding normally (Pain 

controlled yet if she forgets her medication her pain scale increases), Reports 

ambulating normally (In the nursery with oxygen needs)





Objective





- Vital Signs


Latest vital signs: 


 Vital Signs











  Temp Pulse Resp BP Pulse Ox


 


 17 23:26  98.4 F  90  16  97/41 


 


 17 16:00  98.6 F  89  16  109/59  98














- Exam


Extremities: Present: normal


Abdomen: Present: soft, distention


Incision: Present: normal, dry, intact


Uterus: Present: firm





- Labs


Labs: 


 Abnormal Lab Results - Last 24 Hours (Table)











  17 Range/Units





  08:50 07:06 


 


WBC  27.0 H*  24.7 H  (4.0-11.0)  k/uL


 


RBC  3.02 L  2.92 L  (3.80-5.40)  m/uL


 


Hgb  8.7 L D  8.5 L  (11.4-16.0)  gm/dL


 


Hct  28.8 L  27.2 L  (34.0-46.0)  %


 


MCHC  30.1 L   (31.0-37.0)  g/dL


 


Neutrophils #  25.4 H  23.2 H  (1.3-7.7)  k/uL


 


Lymphocytes #  0.7 L  0.9 L  (1.0-4.8)  k/uL














Assessment and Plan


(1) Term pregnancy


Narrative/Plan: 


Continue postoperative care.  She will need simethicone or milk of magnesia 

given without gassy distention of her abdomen.  She states she has not passed 

flatus yet.  Her pain is mostly controlled with oral Ultram.  We did discuss 

pain control this morning and given her multiple ALLERGIES this is the only 

option for her.  I encouraged her to continue to walk the halls, warm showers 

for flatus.


Current Visit: Yes   Status: Acute   Code(s): Z34.80 - ENCOUNTER FOR SUPRVSN OF 

NORMAL PREGNANCY, UNSP TRIMESTER   SNOMED Code(s): 40280202


   





(2) Non-reassuring electronic fetal monitoring tracing


Current Visit: Yes   Status: Acute   Code(s): O76 - ABNLT IN FETAL HEART RATE 

AND RHYTHM COMP LABOR AND DELIVERY   SNOMED Code(s): 173875871

## 2017-11-10 VITALS — RESPIRATION RATE: 16 BRPM

## 2017-11-10 LAB
BASOPHILS # BLD AUTO: 0 K/UL (ref 0–0.2)
BASOPHILS NFR BLD AUTO: 0 %
CH: 29.4
CHCM: 31
EOSINOPHIL # BLD AUTO: 0.1 K/UL (ref 0–0.7)
EOSINOPHIL NFR BLD AUTO: 0 %
ERYTHROCYTE [DISTWIDTH] IN BLOOD BY AUTOMATED COUNT: 2.84 M/UL (ref 3.8–5.4)
ERYTHROCYTE [DISTWIDTH] IN BLOOD: 13.1 % (ref 11.5–15.5)
HCT VFR BLD AUTO: 27.1 % (ref 34–46)
HDW: 2.93
HGB BLD-MCNC: 8.2 GM/DL (ref 11.4–16)
LUC NFR BLD AUTO: 1 %
LYMPHOCYTES # SPEC AUTO: 0.7 K/UL (ref 1–4.8)
LYMPHOCYTES NFR SPEC AUTO: 5 %
MCH RBC QN AUTO: 28.8 PG (ref 25–35)
MCHC RBC AUTO-ENTMCNC: 30.2 G/DL (ref 31–37)
MCV RBC AUTO: 95.4 FL (ref 80–100)
MONOCYTES # BLD AUTO: 0.4 K/UL (ref 0–1)
MONOCYTES NFR BLD AUTO: 3 %
NEUTROPHILS # BLD AUTO: 12.9 K/UL (ref 1.3–7.7)
NEUTROPHILS NFR BLD AUTO: 91 %
WBC # BLD AUTO: 0.11 10*3/UL
WBC # BLD AUTO: 14.1 K/UL (ref 4–11)
WBC (PEROX): 14.88

## 2017-11-10 RX ADMIN — DOCUSATE SODIUM AND SENNOSIDES SCH: 50; 8.6 TABLET ORAL at 14:02

## 2017-11-10 RX ADMIN — POTASSIUM CHLORIDE SCH MLS/HR: 14.9 INJECTION, SOLUTION INTRAVENOUS at 22:26

## 2017-11-10 RX ADMIN — PRENATAL W/O A VIT W/ FE FUMARATE-FA CAP 106.5-1 MG SCH: 106.5 CAPSULE ORAL at 14:03

## 2017-11-10 RX ADMIN — DIMETHICONE SCH: 80 TABLET, CHEWABLE ORAL at 14:03

## 2017-11-10 RX ADMIN — KETOROLAC TROMETHAMINE PRN MG: 30 INJECTION, SOLUTION INTRAMUSCULAR at 03:36

## 2017-11-10 RX ADMIN — POTASSIUM CHLORIDE SCH MLS/HR: 14.9 INJECTION, SOLUTION INTRAVENOUS at 14:31

## 2017-11-10 RX ADMIN — ACETAMINOPHEN PRN MLS/HR: 10 INJECTION, SOLUTION INTRAVENOUS at 15:25

## 2017-11-10 RX ADMIN — DIMETHICONE SCH: 80 TABLET, CHEWABLE ORAL at 22:26

## 2017-11-10 RX ADMIN — POTASSIUM CHLORIDE SCH MLS/HR: 14.9 INJECTION, SOLUTION INTRAVENOUS at 06:20

## 2017-11-10 RX ADMIN — DOCUSATE SODIUM AND SENNOSIDES SCH: 50; 8.6 TABLET ORAL at 22:26

## 2017-11-10 RX ADMIN — DIMETHICONE SCH: 80 TABLET, CHEWABLE ORAL at 15:30

## 2017-11-10 RX ADMIN — DIMETHICONE SCH: 80 TABLET, CHEWABLE ORAL at 22:41

## 2017-11-10 NOTE — P.PNOBGPC
Subjective





- Subjective


Principal diagnosis: Postop day 3, primary section, ileus


Interval history: 





This patient was noted to have some abdominal distention on postop day 2 from 

primary  secondary to nonreassuring fetal heart tones.  She states she 

did ambulate yesterday but was tolerating some oral intake but noted vomiting 

last night.  She is currently nothing by mouth with an IV running her abdomen 

appears distended similarly to yesterday.  A flatplate of the abdomen was 

obtained showing chronic ileus.  We will try dulcolax suppository increase 

ambulation and assessment the patient is doing if no relief will consider NG 

tube


Patient reports: Reports pain well controlled, Reports ambulating normally, 

Reports nauseated


[place]: doing well (In the nursery)





Objective





- Vital Signs


Latest vital signs: 


 Vital Signs











  Temp Pulse Resp BP Pulse Ox


 


 11/10/17 00:00  98.3 F  82  16  104/63 


 


 17 16:00  98.2 F  110 H  18  126/77  98








 Intake and Output











 11/09/17 11/10/17 11/10/17





 22:59 06:59 14:59


 


Intake Total   


 


Balance   


 


Intake:   


 


  Intake, IV Titration   





  Amount   


 


    ACETAMINOPHEN IV (For NPO  1000 





    ) 1,000 mg In Empty Bag 1   





    bag @ 400 mls/hr IVPB   





    Q6HR PRN Rx#:[id number]   


 


    Lactated Ringers 1,000 ml  1000 





    @ 125 mls/hr IV .Q8H TEMO   





    Rx#:[id number]   


 


Other:   


 


  # Emeses  1 














- Exam


Lungs: bilateral: normal


Extremities: Present: normal


Abdomen: Present: soft, distention


Incision: Present: normal, dry, intact


Uterus: Present: firm





- Labs


Labs: 


 Abnormal Lab Results - Last 24 Hours (Table)











  11/10/17 Range/Units





  06:17 


 


WBC  14.1 H  (4.0-11.0)  k/uL


 


RBC  2.84 L  (3.80-5.40)  m/uL


 


Hgb  8.2 L  (11.4-16.0)  gm/dL


 


Hct  27.1 L  (34.0-46.0)  %


 


MCHC  30.2 L  (31.0-37.0)  g/dL


 


Neutrophils #  12.9 H  (1.3-7.7)  k/uL


 


Lymphocytes #  0.7 L  (1.0-4.8)  k/uL














Assessment and Plan


(1) Term pregnancy


Narrative/Plan: 


Will assess later this afternoon, if necessary we will have NG tube placement 

on secondary to ileus.  Did discuss with this patient nothing to eat today will 

continue IV fluids and tried dulcolax suppository.


Current Visit: Yes   Status: Acute   Code(s): Z34.80 - ENCOUNTER FOR SUPRVSN OF 

NORMAL PREGNANCY, UNSP TRIMESTER   SNOMED Code(s): 96648856


   





(2) Non-reassuring electronic fetal monitoring tracing


Current Visit: Yes   Status: Acute   Code(s): O76 - ABNLT IN FETAL HEART RATE 

AND RHYTHM COMP LABOR AND DELIVERY   SNOMED Code(s): 853446112

## 2017-11-10 NOTE — XR
EXAMINATION TYPE: XR abdomen 1V

 

DATE OF EXAM: 11/10/2017

 

COMPARISON: NONE

 

INDICATION: Possible ileus

 

TECHNIQUE: Single view abdomen frontal projection

 

FINDINGS:  

There appears to be some prominent colonic type bowel gas present. Some nonspecific areas in the left
 flank appears to be subcutaneous which may be related to the patient's recent surgery. Likewise, marco
e nonspecific air is within the lower pelvis likely related to the patient's surgery. Consider focal 
acute abdominal series with supine and upright left lateral decubitus views for better delineation of
 the air. Obvious obstruction is not identified.

Psoas margins are normal.

No organomegaly is present.

No suspicious calcifications are evident.

 

IMPRESSION: 

1. There may be a colonic ileus present.

2. There is additional air which is nonspecific likely related to the patient's surgery. Consider add
itional abdominal imaging to better delineate the air.

## 2017-11-11 RX ADMIN — DOCUSATE SODIUM AND SENNOSIDES SCH: 50; 8.6 TABLET ORAL at 10:07

## 2017-11-11 RX ADMIN — DOCUSATE SODIUM AND SENNOSIDES SCH: 50; 8.6 TABLET ORAL at 22:22

## 2017-11-11 RX ADMIN — DIMETHICONE SCH: 80 TABLET, CHEWABLE ORAL at 22:22

## 2017-11-11 RX ADMIN — DIMETHICONE SCH MG: 80 TABLET, CHEWABLE ORAL at 22:32

## 2017-11-11 RX ADMIN — DIMETHICONE SCH: 80 TABLET, CHEWABLE ORAL at 17:05

## 2017-11-11 RX ADMIN — KETOROLAC TROMETHAMINE PRN MG: 30 INJECTION, SOLUTION INTRAMUSCULAR at 17:12

## 2017-11-11 RX ADMIN — PRENATAL W/O A VIT W/ FE FUMARATE-FA CAP 106.5-1 MG SCH: 106.5 CAPSULE ORAL at 10:07

## 2017-11-11 RX ADMIN — DIMETHICONE SCH: 80 TABLET, CHEWABLE ORAL at 10:07

## 2017-11-11 RX ADMIN — IBUPROFEN PRN MG: 600 TABLET ORAL at 10:31

## 2017-11-11 NOTE — P.PNOBGPC
Subjective





- Subjective


Interval history: 





postop day 4.  She reports feeling very hungry.  She is having some loose bowel 

movements but minimal flatus.  No nausea or vomiting over the last 24 hours.  

Complaining of some lower abdominal cramping.  Minimal vaginal bleeding.  She 

has tolerated some clear liquids through the night.


Patient reports: Reports voiding normally, Reports pain well controlled, 

Reports ambulating normally, Denies nauseated


[place]: doing well (special care nursery)





Objective





- Vital Signs


Latest vital signs: 


 Vital Signs











  Temp Pulse Resp BP BP Pulse Ox


 


 17 07:33  98.4 F  88  16  121/72  


 


 17 00:00  98.4 F  87  16   120/69  100


 


 11/10/17 16:00  96.9 F L  93  16   123/85  100








 Intake and Output











 11/10/17 11/11/17 11/11/17





 22:59 06:59 14:59


 


Other:   


 


  # Voids  1 


 


  # Bowel Movements 1  














- Exam


Extremities: Present: normal


Abdomen: Present: soft, distention (moderately distended but soft), tenderness.

  Absent: rigidity


Incision: Present: normal, dry, intact.  Absent: erythematous


Uterus: Present: normal, firm.  Absent: tenderness





Assessment and Plan


(1) Postoperative ileus


Narrative/Plan: 


normal bowel sounds and having loose bowel movements.  She is tolerated clear 

liquids however her abdomen remains moderately distended.  We will continue 

clear liquids throughout the day and consider advancing to a regular diet this 

evening or tomorrow morning.


Current Visit: Yes   Status: Acute   Code(s): K91.89 - OTH POSTPROCEDURAL 

COMPLICATIONS AND DISORDERS OF DGSTV SYS; K56.7 - ILEUS, UNSPECIFIED   SNOMED 

Code(s): 514157954


   





(2) S/P  section


Current Visit: Yes   Status: Acute   Code(s): Z98.891 - HISTORY OF UTERINE SCAR 

FROM PREVIOUS SURGERY   SNOMED Code(s): 096973979


   





(3) Anemia


Narrative/Plan: 


postoperative anemia.  Hemodynamically stable.


Current Visit: Yes   Status: Acute   Code(s): D64.9 - ANEMIA, UNSPECIFIED   

SNOMED Code(s): 919510424

## 2017-11-12 VITALS — TEMPERATURE: 98.4 F | SYSTOLIC BLOOD PRESSURE: 107 MMHG | DIASTOLIC BLOOD PRESSURE: 71 MMHG | HEART RATE: 77 BPM

## 2017-11-12 RX ADMIN — KETOROLAC TROMETHAMINE PRN MG: 30 INJECTION, SOLUTION INTRAMUSCULAR at 00:44

## 2017-11-12 RX ADMIN — POTASSIUM CHLORIDE SCH: 14.9 INJECTION, SOLUTION INTRAVENOUS at 04:53

## 2017-11-12 RX ADMIN — IBUPROFEN PRN MG: 600 TABLET ORAL at 12:44

## 2017-11-12 RX ADMIN — POTASSIUM CHLORIDE SCH: 14.9 INJECTION, SOLUTION INTRAVENOUS at 04:54

## 2017-11-12 NOTE — P.DS
Providers


Date of admission: 


17 08:39





Expected date of discharge: 17


Attending physician: 


Sharlene Fuller





Primary care physician: 


Stated None








- Discharge Diagnosis(es)


(1) Postoperative ileus


Current Visit: Yes   Status: Acute   





(2) S/P  section


Current Visit: Yes   Status: Acute   





(3) Anemia


Current Visit: Yes   Status: Acute   





(4) Non-reassuring electronic fetal monitoring tracing


Current Visit: Yes   Status: Acute   





(5) Term pregnancy


Current Visit: Yes   Status: Acute   


Hospital Course: 





This is a 21-year-old little 1 now para 1 woman who presented at 38-6/7 weeks 

gestation in active labor.  On initial presentation she had nonreassuring fetal 

heart tones with fetal tachycardia and deep variable fetal heart rate 

decelerations.  She was taken for urgent primary low transverse  

section.  Findings at the time of surgery were remarkable for meconium stained 

amniotic fluid.  Female infant had Apgars of 3 at 1 minute, 8 at 5 minutes and 

9 at 10 minutes.  Weight was 7 lbs. 10 oz.  The patient's postoperative course 

was remarkable for development of some abdominal distention and vomiting on 

postoperative day number area she was made nothing by mouth and abdominal films 

confirmed mild colonic ileus.  By postoperative day #3 she was having loose 

bowel movements after Dulcolax suppository and was feeling very hungry.  Her 

diet was advanced to clears.  By the evening of postoperative day #4 she was 

started on a regular diet.  By the morning of postoperative day #5 her 

abdominal distention was significantly decreased.  She was regularly passing 

gas and tolerating a general diet without nausea or vomiting.  Her incision 

appeared well healing and she had minimal vaginal bleeding.  She was therefore 

discharged home with routine instructions for postoperative care and follow-up.


Procedures: 





Primary low transverse  section


Patient Condition at Discharge: Good





Plan - Discharge Summary


New Discharge Prescriptions: 


New


   Ibuprofen [Motrin] 600 mg PO Q6HR PRN #30 tab


     PRN Reason: Mild Pain Or Fever >= 100.5


   Sennosides-Docusate Sodium [Senokot-S] 2 each PO BID@0800,2000  tab


   traMADol-ACETAMINOP 37.5-325MG [Ultracet] 1 each PO Q4HR PRN #20 tab


     PRN Reason: Pain





Discontinued


   Prenatal Vit-Iron-Folic Acid [Prenatal-U Capsule (formulary)] 1 cap PO DAILY 

#30 cap


Discharge Medication List





Ibuprofen [Motrin] 600 mg PO Q6HR PRN #30 tab 17 [Rx]


Sennosides-Docusate Sodium [Senokot-S] 2 each PO BID@0800,2000  tab 17 [Rx

]


traMADol-ACETAMINOP 37.5-325MG [Ultracet] 1 each PO Q4HR PRN #20 tab 17 [

Rx]








Follow up Appointment(s)/Referral(s): 


Sharlene Fuller DO [Doctor of Osteopathic Medicine] - 10 Days


Activity/Diet/Wound Care/Special Instructions: 


Follow-up in 2 weeks after surgery in the office.  Call the office with any 

concerning signs or symptoms including fever greater than 101, severe 

abdominal pain, heavy vaginal bleeding, signs of wound infection, increased 

swelling or redness of the lower extremities, signs of postpartum depression.  

No driving for 2 weeks after surgery.  No heavy lifting or vigorous activity 

until reevaluated in the office.  No intercourse for 6 weeks after delivery.


Discharge Disposition: HOME SELF-CARE

## 2017-11-20 NOTE — P.MSEPDOC
Presenting Problems





- Arrival Data


Date of Arrival on Unit: 10/30/17


Time of Arrival on Unit: 16:21


Mode of Transport: Ambulatory





- Complaint


OB-Reason for Admission/Chief Complaint: Possible Onset of Labor





Prenatal Medical History





- Pregnancy Information


: 1


Para: 0


Term: 0


: 0


Abortions: Spontaneous or Elective: 0


Number of Living Children: 0





- Gestational Age


Gestational Age by AIDAN (wks/days): 37 Weeks and 5 Days





Review of Systems





- Review of Systems


Constitutional: No problems


Breast: No problems


ENT: No problems


Cardiovascular: No problems


Respiratory: No problems


Gastrointestinal: No problems


Genitourinary: No problems


Musculoskeletal: No problems


Neurological: No problems


Skin: No problems





Vital Signs





- Temperature


Temperature: 98.6 F


Temperature Source: Temporal Artery Scan





- Pulse


  ** Right Sitting


Pulse Rate: 88


Pulse Assessment Method: Automatic Cuff





- Respirations


Respiratory Rate: 16


Oxygen Delivery Method: Room Air





- Blood Pressure


  ** Right Arm


Blood Pressure: 129/74


Blood Pressure Mean: 92


Blood Pressure Source: Automatic Cuff





Medical Screen Scoring (Pre)





- Cervical Exam


Dilation: 1-3 cm = 1


Effacement: More than 50% = 2


Membranes: Intact





- Uterine Contractions


Frequency: > or = 36 weeks =2


Duration: N/A





- Maternal Vital Signs


Maternal Temperature: N/A


Maternal Blood Pressure: N/A


Signs of Preeclampsia: N/A


Maternal Respirations: N/A





- Maternal Trauma


Maternal Trauma: N/A





- Fetal Assessment


Baseline FHR: 150


Fetal Heart Rate - NICHD Category: Category I (Normal) = 0


NST: Reactive


Fetal Position: N/A


Fetal Station: N/A





- Total Score


Total Score (Pre): 5





- Level of Risk


Level of Risk: Medium (6-9)





Physician Notification (Pre)





- Physician Notified


Physician Notified Date: 10/30/17


Physician Notified Time: 16:41


Physician/Practitioner Notifed:: Hurtubise


New Order Received: Yes (recheck cervix in 1 hour, IV hydration)





Medical Screen Scoring (Post)





- Cervical Exam


Dilation: 1-3 cm = 1


Effacement: More than 50% = 2


Membranes: Intact





- Uterine Contractions


Frequency: > or = 36 weeks =2


Duration: > 40 seconds = 2





- Maternal Vital Signs


Maternal Temperature: N/A


Maternal Blood Pressure: N/A


Signs of Preeclampsia: N/A


Maternal Respirations: N/A





- Maternal Trauma


Maternal Trauma: N/A





- Fetal Assessment


Fetal Heart Rate: 145


Fetal Heart Rate - NICHD Category: Category I (Normal) = 0


NST: Reactive


Fetal Position: N/A


Fetal Station: N/A





- Total Score


Total Score (Post): 7





- Post Treatment Level of Risk


Post Treatment Level of Risk: Medium (6-9)





Physician Notification (Post)





- Physician Notified


Physician Notified Date: 10/30/17


Physician Notified Time: 18:58


Physician/Practitioner Notified:: Wilfredo


New Order Received: Yes (D/c home)





- Notification Comment


Comment: Pt has F/U appt with Dr. Fuller 





Disposition





- Disposition


OB Disposition: Discharge to home


Discharge Date: 10/30/17


Discharge Time: 19:01


I agree with the RN Medical Screening Exam: Yes


Risk & Benefit of care provided described in d/c instruction: No


Diagnosis: FALSE LABOR BEFORE 37 COMPLETED WEEKS OF GEST, THIRD TRI

## 2019-12-14 ENCOUNTER — HOSPITAL ENCOUNTER (OUTPATIENT)
Dept: HOSPITAL 47 - FBPOP | Age: 24
LOS: 1 days | Discharge: HOME | End: 2019-12-15
Attending: OBSTETRICS & GYNECOLOGY
Payer: COMMERCIAL

## 2019-12-14 DIAGNOSIS — O47.1: Primary | ICD-10-CM

## 2019-12-14 DIAGNOSIS — Z3A.38: ICD-10-CM

## 2019-12-14 PROCEDURE — 59025 FETAL NON-STRESS TEST: CPT

## 2019-12-14 PROCEDURE — 99213 OFFICE O/P EST LOW 20 MIN: CPT

## 2019-12-15 ENCOUNTER — HOSPITAL ENCOUNTER (INPATIENT)
Dept: HOSPITAL 47 - FBPOP | Age: 24
LOS: 2 days | Discharge: HOME | End: 2019-12-17
Attending: OBSTETRICS & GYNECOLOGY | Admitting: OBSTETRICS & GYNECOLOGY
Payer: COMMERCIAL

## 2019-12-15 VITALS
DIASTOLIC BLOOD PRESSURE: 66 MMHG | HEART RATE: 78 BPM | TEMPERATURE: 97.2 F | SYSTOLIC BLOOD PRESSURE: 131 MMHG | RESPIRATION RATE: 16 BRPM

## 2019-12-15 VITALS — RESPIRATION RATE: 16 BRPM

## 2019-12-15 DIAGNOSIS — O34.211: Primary | ICD-10-CM

## 2019-12-15 DIAGNOSIS — Z88.5: ICD-10-CM

## 2019-12-15 DIAGNOSIS — Z3A.39: ICD-10-CM

## 2019-12-15 DIAGNOSIS — F31.9: ICD-10-CM

## 2019-12-15 DIAGNOSIS — Z88.0: ICD-10-CM

## 2019-12-15 DIAGNOSIS — F41.9: ICD-10-CM

## 2019-12-15 LAB
BASOPHILS # BLD AUTO: 0 K/UL (ref 0–0.2)
BASOPHILS NFR BLD AUTO: 0 %
EOSINOPHIL # BLD AUTO: 0.1 K/UL (ref 0–0.7)
EOSINOPHIL NFR BLD AUTO: 1 %
ERYTHROCYTE [DISTWIDTH] IN BLOOD BY AUTOMATED COUNT: 3.71 M/UL (ref 3.8–5.4)
ERYTHROCYTE [DISTWIDTH] IN BLOOD: 14.2 % (ref 11.5–15.5)
HCT VFR BLD AUTO: 32.3 % (ref 34–46)
HGB BLD-MCNC: 10.3 GM/DL (ref 11.4–16)
LYMPHOCYTES # SPEC AUTO: 1.2 K/UL (ref 1–4.8)
LYMPHOCYTES NFR SPEC AUTO: 8 %
MCH RBC QN AUTO: 27.8 PG (ref 25–35)
MCHC RBC AUTO-ENTMCNC: 31.9 G/DL (ref 31–37)
MCV RBC AUTO: 87 FL (ref 80–100)
MONOCYTES # BLD AUTO: 0.6 K/UL (ref 0–1)
MONOCYTES NFR BLD AUTO: 4 %
NEUTROPHILS # BLD AUTO: 13.4 K/UL (ref 1.3–7.7)
NEUTROPHILS NFR BLD AUTO: 87 %
PLATELET # BLD AUTO: 251 K/UL (ref 150–450)
WBC # BLD AUTO: 15.5 K/UL (ref 3.8–10.6)

## 2019-12-15 PROCEDURE — 86901 BLOOD TYPING SEROLOGIC RH(D): CPT

## 2019-12-15 PROCEDURE — 90707 MMR VACCINE SC: CPT

## 2019-12-15 PROCEDURE — 85025 COMPLETE CBC W/AUTO DIFF WBC: CPT

## 2019-12-15 PROCEDURE — 86850 RBC ANTIBODY SCREEN: CPT

## 2019-12-15 PROCEDURE — 86900 BLOOD TYPING SEROLOGIC ABO: CPT

## 2019-12-15 RX ADMIN — POTASSIUM CHLORIDE SCH MLS/HR: 14.9 INJECTION, SOLUTION INTRAVENOUS at 19:21

## 2019-12-15 RX ADMIN — DOCUSATE SODIUM AND SENNOSIDES SCH EACH: 50; 8.6 TABLET ORAL at 19:57

## 2019-12-15 RX ADMIN — KETOROLAC TROMETHAMINE PRN MG: 30 INJECTION, SOLUTION INTRAMUSCULAR at 22:31

## 2019-12-15 RX ADMIN — POTASSIUM CHLORIDE SCH MLS/HR: 14.9 INJECTION, SOLUTION INTRAVENOUS at 12:20

## 2019-12-15 NOTE — P.OP
Date of Procedure: 12/15/19


Preoperative Diagnosis: 


1.   at 39 weeks


2.  Previous  section


3.  Arrest of descent


Postoperative Diagnosis: 


1.   at 39 weeks


2.  Previous  section


3.  Arrest of descent


Procedure(s) Performed: 


repeat low transverse 


Anesthesia: spinal


Surgeon: Tete Nicole


Assistant #1: Helen Villalobos


Estimated Blood Loss (ml): 400


IV fluids (ml): 600


Urine output (ml): 500


Pathology: none sent


Condition: stable


Disposition: floor


Indications for Procedure: 


23-year-old  presented at 39 weeks in active labor.  She was 8 cm dilated 

when she presented.  She did not make any cervical change despite adequate 

contractions for at least 3 hours.  Considering she is a vaginal birth after 

 the risks were reevaluated and discussed with the patient and the 

 who both decide along with medical personnel to perform a repeat low 

transverse .


Operative Findings: 


viable female, Apgars 8, 9, weight 8 pounds.  Normal uterus tubes and ovaries.


Description of Procedure: 


Patient was taken to the operating room where spinal anesthesia was found be 

adequate.  She was prepped and draped in normal sterile fashion in dorsal supine

position with a leftward tilt.  Pfannenstiel skin incision was made the scalpel 

and carried through to the underlying layer of fascia with the scalpel.  Fascia 

was incised in midline and carried bilaterally with the Arnold scissors.  The 

superior aspect of the fascial incision was grasped with Reji clamps elevated 

and the underlying rectus muscles dissected off with the Arnold's.  Attention was 

then turned to inferior aspect of same incision which in a similar fashion was 

grasped tented up and the underlying rectus muscles dissected off with the 

Arnold's.  The rectus muscles were  the midline and the peritoneum was 

identified tented up and entered sharply with the scalpel.  The incision was 

extended superiorly and inferiorly with good visualization of the bladder.  The 

bladder blade was inserted and the vesicouterine peritoneum was incised the 

Metzenbaums then carried bilaterally and bladder flap created digitally.  A low 

transverse incision was then made on the uterus with the scalpel.  This was 

carried bilaterally and digital manner.  Infant's head delivered atraumatically,

nose and mouth bulb suctioned, cord clamped and cut, infant handed off to 

waiting nurses.  Apgars 8,9, weight 8 lbs.   Placenta delivered manually, intact

with three-vessel cord.  The uterus is exteriorized and cleared of all clots and

debris.  The uterine incision was closed with 0 Vicryl in a running locked 

fashion.  Second layer of the same sutures used in imbricating fashion to obtain

excellent hemostasis.  Bladder flap was then reapproximated using 2-0 Vicryl in 

a running fashion.  Both ovaries and tubes appeared normal.  The uterus was 

placed back into the abdomen.  The peritoneum was reapproximated using 2-0 

Vicryl in a running fashion.  The muscles were reapproximated using 2-0 Vicryl 

in interrupted fashion.  The fascia was reapproximated using 0 Vicryl in a 

running fashion.  The subcutaneous tissues closed with 3-0 Vicryl running 

fashion.  The skin was closed staples.  Patient tolerated the procedure well, 

sponge and instrument counts were correct times 2 and she was taken to the 

recovery room in stable condition.

## 2019-12-15 NOTE — P.MSEPDOC
Presenting Problems





- Arrival Data


Date of Arrival on Unit: 12/15/19


Time of Arrival on Unit: 22:30


Mode of Transport: Ambulatory





- Complaint


OB-Reason for Admission/Chief Complaint: Possible Onset of Labor





Prenatal Medical History





- Pregnancy Information


: 2


Para: 1


Term: 1


: 0


Abortions: Spontaneous or Elective: 0


Number of Living Children: 1





- Gestational Age


Gestational Age by AIDAN (wks/days): 38 Weeks and 0 Days





Review of Systems





- Review of Systems


Constitutional: No problems


Breast: No problems


ENT: No problems


Cardiovascular: No problems


Respiratory: No problems


Gastrointestinal: No problems


Genitourinary: No problems


Musculoskeletal: No problems


Neurological: No problems


Skin: No problems





Vital Signs





- Temperature


Temperature: 97.2 F


Temperature Source: Temporal Artery Scan





- Pulse


  ** Pulse Oximetery


Pulse Rate: 78


Pulse Assessment Method: Pulse Oximetry





- Respirations


Respiratory Rate: 16


Oxygen Delivery Method: Room Air





- Blood Pressure


  ** Sitting


Blood Pressure: 131/66


Blood Pressure Mean: 87


Blood Pressure Source: Automatic Cuff





Medical Screen Scoring (Pre)





- Cervical Exam


Dilation: 0 cm = 0


Membranes: Intact





- Uterine Contractions


Frequency: > or = 36 weeks =2


Duration: > 40 seconds = 2


Intensity: N/A





- Maternal Vital Signs


Maternal Temperature: N/A


Maternal Blood Pressure: N/A


Signs of Preeclampsia: N/A


Maternal Respirations: N/A





- Maternal Trauma


Maternal Trauma: N/A





- Fetal Assessment - Baby A


Baseline FHR: 135


Fetal Heart Rate - NICHD Category: Category I (Normal) = 0


NST: Reactive


Fetal Position: N/A


Fetal Station: N/A





- Total Score - Baby A


Total Score - Baby A: 4





- Total Score - Baby B


Total Score - Baby B: 4





- Total Score - Baby C


Total Score - Baby C: 4





- Level of Risk - Baby A


Level of Risk - Baby A: Low (0-5)





- Level of Risk - Baby B


Level of Risk - Baby B: Low (0-5)





- Level of Risk - Baby C


Level of Risk - Baby C: Low (0-5)





Physician Notification (Pre)





- Physician Notified


Physician Notified Date: 19


Physician Notified Time: 23:51


New Order Received: Yes





- Notification Comment


Comment: Orders given to discharge patient home with instructions.





Disposition





- Disposition


OB Disposition: Discharge to home, Written follow up instructions reviewed


Discharge Date: 12/15/19


Discharge Time: 00:04


I agree with the RN Medical Screening Exam: Yes


Risk & Benefit of care provided described in d/c instruction: Yes


Diagnosis: FALSE LABOR AT OR AFTER 37 COMPLETED WEEKS OF GESTATION

## 2019-12-15 NOTE — P.HPOB
History of Present Illness


H&P Date: 12/15/19


Chief Complaint: labor





23-year-old  presented at 39 weeks in active labor.  Her cervix was 8 cm 

dilated, 90% effaced, and -2 station.  She is rajiv every 2-4 minutes.  

Fetal heart tones 130 with moderate variability and reactive.  She did have a 

previous  section was planning a repeat  but as she came in 8 

cm we will let her try .  Vaginal birth after  with all risks, 

benefits and alternatives were discussed with the patient and her .  The 

patient decided to do a trial of labor.





Review of Systems


All systems: negative


Constitutional: Denies chills, Denies fever


Eyes: denies blurred vision, denies pain


Ears, nose, mouth and throat: Denies headache, Denies sore throat


Cardiovascular: Denies chest pain, Denies shortness of breath


Respiratory: Denies cough


Gastrointestinal: Denies abdominal pain, Denies diarrhea, Denies nausea, Denies 

vomiting


Genitourinary: Denies dysuria, Denies hematuria


Musculoskeletal: Denies myalgias


Integumentary: Denies pruritus, Denies rash


Neurological: Denies numbness, Denies weakness


Psychiatric: Denies anxiety, Denies depression


Endocrine: Denies fatigue, Denies weight change





Past Medical History


Additional Past Medical History / Comment(s): endometreosis.  obstetrics 

history: First pregnancy was a primary  for fetal heart tones.  This is

her second pregnancy.  She's had prenatal care with Dr. Bartholomew.  Blood type is 

O+, abs negative, rubella nonimmune, hep is B-, HIV nonreactive, RPR negative.


History of Any Multi-Drug Resistant Organisms: None Reported


Past Surgical History:  Section, Hernia Repair


Past Anesthesia/Blood Transfusion Reactions: No Reported Reaction


Past Psychological History: Anxiety, Bipolar


Smoking Status: Never smoker


Past Alcohol Use History: None Reported


Past Drug Use History: Marijuana


Additional Drug Use History / Comment(s): pt states discontinued use but family 

she lives with still smokes





- Past Family History


  ** Mother


Family Medical History: Blood Disorder


Additional Family Medical History / Comment(s): ITP TTP





Medications and Allergies


                                Home Medications











 Medication  Instructions  Recorded  Confirmed  Type


 


Pnv No.95/Ferrous Fum/Folic AC 1 each PO DAILY 12/14/19 12/15/19 History





[Prenatal Multivitamin Tablet]    


 


Sertraline [Zoloft] 1 tab PO DAILY 12/14/19 12/15/19 History








                                    Allergies











Allergy/AdvReac Type Severity Reaction Status Date / Time


 


codeine Allergy  Anaphylaxis Verified 12/15/19 11:39


 


Penicillins Allergy  Rash/Hives Verified 12/15/19 11:39














Exam


Osteopathic Statement: *.  No significant issues noted on an osteopathic struct

ural exam other than those noted in the History and Physical/Consult.


                                   Vital Signs











  Temp Pulse Resp BP Pulse Ox


 


 12/15/19 11:30  97.8 F  97  18  135/77  100








                                Intake and Output











 12/14/19 12/15/19 12/15/19





 22:59 06:59 14:59


 


Other:   


 


  Weight   60.781 kg














Heart: Regular rate and rhythm


Lungs: Clear to auscultation bilaterally


Abdomen: Soft, nontender


Extremities: Negative Homans sign





Results


Result Diagrams: 


                                 12/15/19 11:49





                  Abnormal Lab Results - Last 24 Hours (Table)











  12/15/19 Range/Units





  11:49 


 


WBC  15.5 H  (3.8-10.6)  k/uL


 


RBC  3.71 L  (3.80-5.40)  m/uL


 


Hgb  10.3 L  (11.4-16.0)  gm/dL


 


Hct  32.3 L  (34.0-46.0)  %


 


Neutrophils #  13.4 H  (1.3-7.7)  k/uL














Assessment and Plan


(1) Normal labor


Current Visit: Yes   Status: Acute   Code(s): O80 - ENCOUNTER FOR FULL-TERM 

UNCOMPLICATED DELIVERY; Z37.9 - OUTCOME OF DELIVERY, UNSPECIFIED   SNOMED 

Code(s): 21529350


   





(2) Previous  section


Current Visit: Yes   Status: Acute   Code(s): Z98.891 - HISTORY OF UTERINE SCAR 

FROM PREVIOUS SURGERY   SNOMED Code(s): 040485329


   


Plan: 





1.  Admit to family birth place


2.  Expectant management


3.  Anticipate normal vaginal delivery though the patient is a  and we did 

discuss the risks and benefits of a repeat  versus vaginal birth after 

.

## 2019-12-16 LAB
BASOPHILS # BLD AUTO: 0 K/UL (ref 0–0.2)
BASOPHILS NFR BLD AUTO: 0 %
EOSINOPHIL # BLD AUTO: 0.1 K/UL (ref 0–0.7)
EOSINOPHIL NFR BLD AUTO: 1 %
ERYTHROCYTE [DISTWIDTH] IN BLOOD BY AUTOMATED COUNT: 3.34 M/UL (ref 3.8–5.4)
ERYTHROCYTE [DISTWIDTH] IN BLOOD: 14.2 % (ref 11.5–15.5)
HCT VFR BLD AUTO: 29.3 % (ref 34–46)
HGB BLD-MCNC: 9.2 GM/DL (ref 11.4–16)
LYMPHOCYTES # SPEC AUTO: 1.8 K/UL (ref 1–4.8)
LYMPHOCYTES NFR SPEC AUTO: 11 %
MCH RBC QN AUTO: 27.5 PG (ref 25–35)
MCHC RBC AUTO-ENTMCNC: 31.3 G/DL (ref 31–37)
MCV RBC AUTO: 87.8 FL (ref 80–100)
MONOCYTES # BLD AUTO: 0.7 K/UL (ref 0–1)
MONOCYTES NFR BLD AUTO: 4 %
NEUTROPHILS # BLD AUTO: 12.7 K/UL (ref 1.3–7.7)
NEUTROPHILS NFR BLD AUTO: 83 %
PLATELET # BLD AUTO: 237 K/UL (ref 150–450)
WBC # BLD AUTO: 15.4 K/UL (ref 3.8–10.6)

## 2019-12-16 RX ADMIN — DOCUSATE SODIUM AND SENNOSIDES SCH EACH: 50; 8.6 TABLET ORAL at 08:00

## 2019-12-16 RX ADMIN — IBUPROFEN PRN MG: 600 TABLET ORAL at 12:44

## 2019-12-16 RX ADMIN — KETOROLAC TROMETHAMINE PRN MG: 30 INJECTION, SOLUTION INTRAMUSCULAR at 05:47

## 2019-12-16 RX ADMIN — IBUPROFEN PRN MG: 600 TABLET ORAL at 22:34

## 2019-12-16 RX ADMIN — DOCUSATE SODIUM AND SENNOSIDES SCH: 50; 8.6 TABLET ORAL at 21:45

## 2019-12-16 NOTE — P.PN
Progress Note - Text


Progress Note Date: 12/16/19





POD 1 SAB with morphine.  doing well.  No parasthesia, no weakness, no 

headaches.  able to urinate.  no bowel movement. Mental status at baseline. 

doing well, call anesthesia with questions.

## 2019-12-16 NOTE — P.PNOBGPC
Subjective





- Subjective


Principal diagnosis: postop day 1


Interval history: 





this addendum is doing very well postop day 1.  She is ambulating, voiding and 

tolerating her diet.  She voices no complaints this time.


Patient reports: Reports appetite normal, Reports voiding normally, Reports pain

well controlled, Reports ambulating normally


[place]: doing well





Objective





- Vital Signs


Latest vital signs: 


                                   Vital Signs











  Temp Pulse Resp BP Pulse Ox


 


 19 08:00  98.2 F  61  16  112/57 


 


 19 04:00  98.4 F  54 L  16  86/37  97


 


 19 00:00  97.9 F  64  16  117/68  98


 


 12/15/19 20:00  98.2 F  63  16  118/72  98


 


 12/15/19 17:00  96.4 F L  53 L  15  118/68 


 


 12/15/19 16:30   62  15  117/58 


 


 12/15/19 16:00   51 L  16  114/57  100


 


 12/15/19 15:41   62  16  112/56  99


 


 12/15/19 15:29   61  15  95/64  99


 


 12/15/19 15:15   69  15  101/57  100


 


 12/15/19 15:00  96.5 F L  79  16  103/51  99


 


 12/15/19 11:30  97.8 F  97  18  135/77  100








                                Intake and Output











 12/15/19 12/16/19 12/16/19





 22:59 06:59 14:59


 


Intake Total  600 


 


Output Total 1000 225 


 


Balance -1000 375 


 


Intake:   


 


  Other  600 


 


Output:   


 


  Urine 1000 225 


 


    Uretheral ([place]) 600  














- Exam


Lungs: bilateral: normal


Chest: Normal S1, Normal S2


Extremities: Present: normal


Abdomen: Present: normal appearance, soft.  Absent: distention, tenderness


Incision: Present: normal, dry, intact


Uterus: Present: normal, firm





- Labs


Labs: 


                  Abnormal Lab Results - Last 24 Hours (Table)











  12/15/19 12/16/19 Range/Units





  11:49 05:31 


 


WBC  15.5 H  15.4 H  (3.8-10.6)  k/uL


 


RBC  3.71 L  3.34 L  (3.80-5.40)  m/uL


 


Hgb  10.3 L  9.2 L  (11.4-16.0)  gm/dL


 


Hct  32.3 L  29.3 L  (34.0-46.0)  %


 


Neutrophils #  13.4 H  12.7 H  (1.3-7.7)  k/uL

## 2019-12-17 VITALS — HEART RATE: 57 BPM | DIASTOLIC BLOOD PRESSURE: 55 MMHG | TEMPERATURE: 98.2 F | SYSTOLIC BLOOD PRESSURE: 110 MMHG

## 2019-12-17 RX ADMIN — DOCUSATE SODIUM AND SENNOSIDES SCH: 50; 8.6 TABLET ORAL at 11:57

## 2019-12-17 RX ADMIN — IBUPROFEN PRN MG: 600 TABLET ORAL at 08:09

## 2019-12-17 NOTE — P.DS
Providers


Date of admission: 


12/15/19 11:27





Expected date of discharge: 12/17/19


Attending physician: 


Mychal Mata





Primary care physician: 


Stated None





Hospital Course: 





Azra is doing very well post op day 2.  She is involuting, voiding and 

tolerating her diet.  She voices no complaints and requests discharged to home 

today.  Prescription for Motrin and breast pump are provided.  Discharge 

instructions were thoroughly reviewed.  On physical exam vital signs are stable 

and afebrile.  Heart regular, lungs clear, extremities without pain.  Abdomen 

soft nontender.  Incision is clean dry and intact.  Assessment post op day 2.  

Plan discharged home follow up with me in 1 week.  We'll plan to remove staples 

and place Steri-Strips today.


Patient Condition at Discharge: Good





Plan - Discharge Summary


New Discharge Prescriptions: 


New


   Ibuprofen [Motrin] 600 mg PO Q6HR PRN #30 tab


     PRN Reason: Pain





No Action


   Pnv No.95/Ferrous Fum/Folic AC [Prenatal Multivitamin Tablet] 1 each PO DAILY


   Sertraline [Zoloft] 1 tab PO DAILY


Discharge Medication List





Pnv No.95/Ferrous Fum/Folic AC [Prenatal Multivitamin Tablet] 1 each PO DAILY 

12/14/19 [History]


Sertraline [Zoloft] 1 tab PO DAILY 12/14/19 [History]


Ibuprofen [Motrin] 600 mg PO Q6HR PRN #30 tab 12/17/19 [Rx]








Follow up Appointment(s)/Referral(s): 


Mychal Mata DO [Doctor of Osteopathic Medicine] - 1 Week


Activity/Diet/Wound Care/Special Instructions: 


No heavy lifting, limit stairs and driving, and pelvic rest.  If any high 

temperatures, heavy bleeding, or severe pain call my office


Discharge Disposition: HOME SELF-CARE

## 2020-02-04 ENCOUNTER — HOSPITAL ENCOUNTER (OUTPATIENT)
Dept: HOSPITAL 47 - LABWHC1 | Age: 25
Discharge: HOME | End: 2020-02-04
Attending: OBSTETRICS & GYNECOLOGY
Payer: COMMERCIAL

## 2020-02-04 DIAGNOSIS — N91.2: Primary | ICD-10-CM

## 2020-02-04 PROCEDURE — 36415 COLL VENOUS BLD VENIPUNCTURE: CPT

## 2020-02-04 PROCEDURE — 84702 CHORIONIC GONADOTROPIN TEST: CPT

## 2020-10-14 ENCOUNTER — HOSPITAL ENCOUNTER (OUTPATIENT)
Dept: HOSPITAL 47 - RADUSWWP | Age: 25
Discharge: HOME | End: 2020-10-14
Attending: OBSTETRICS & GYNECOLOGY
Payer: COMMERCIAL

## 2020-10-14 DIAGNOSIS — R94.8: Primary | ICD-10-CM

## 2020-10-14 PROCEDURE — 76856 US EXAM PELVIC COMPLETE: CPT

## 2020-10-14 NOTE — US
EXAMINATION TYPE: US pelvic complete

 

DATE OF EXAM: 10/14/2020

 

COMPARISON: NONE

 

CLINICAL HISTORY: R10.2 Pelvic Pain. Spotting.  Patient is on depo shot.  

 

TECHNIQUE:  Transabdominal (TA).  Transabdominal sonographic images of the pelvis were acquired.  

 

Date of LMP:  2020, 

 

EXAM MEASUREMENTS:

 

Uterus:  7.4 x 5.2 x 3.8 cm

Endometrial Stripe: 0.2 cm

Right Ovary:  3.0 x 1.8 x 1.9 cm

Left Ovary:  3.4 x 1.7 x 2.2 cm

 

 

 

1. Uterus:  Anteverted and anteflexed.   There is heterogenous myometrial echotexture texture and are
as of obscured endometrial myometrial border.

2. Endometrium:  Normal.

3. Right Ovary:  Normal. Follicular changes.

4. Left Ovary:  Normal. Follicular changes.

5. Bilateral Adnexa:  Normal.

6. Posterior cul-de-sac:  No free fluid.

 

 

 

IMPRESSION: 

1. Heterogenous myometrium and areas of obscured endometrial and myometrial border. Findings may repr
esent adenomyosis. MRI of the pelvis may be of benefit for further characterization.

2. Normal ovaries bilaterally.

3. No pelvic free fluid.

## 2022-10-13 ENCOUNTER — HOSPITAL ENCOUNTER (OUTPATIENT)
Dept: HOSPITAL 47 - RADCTMAIN | Age: 27
Discharge: HOME | End: 2022-10-13
Attending: ORTHOPAEDIC SURGERY
Payer: SELF-PAY

## 2022-10-13 DIAGNOSIS — S82.51XA: Primary | ICD-10-CM

## 2022-10-13 NOTE — CT
EXAMINATION TYPE: CT ankle RT wo con

CT DLP: 274 mGycm, Automated exposure control for dose reduction was used.

 

DATE OF EXAM: 10/13/2022 2:04 PM

 

COMPARISON: None.

 

CLINICAL INDICATION:Female, 26 years old with history of Surgical planning; S82.51XA; [facility], Surgical p
nelida, pt fractured ankle this morning. Fall

 

TECHNIQUE: Axial images were obtained of the right ankle without the use of IV contrast.  Additional 
coronal and sagittal reformatted images and soft tissue and bone window were obtained for review. 3-D
 reconstruction was created on a separate workstation. 

 

FINDINGS: Acute minimally displaced fracture of the medial malleolus with extension into the tibial p
ro/articular surface. No subluxation or dislocation. Remaining osseous structures are intact. The
re is approximately 3 mm of anterior lateral displacement of the medial malleolus fragment.  No signi
ficant soft tissue swelling is identified. Small ankle joint effusion. Small sclerotic focus within t
he cuboid favored to represent a benign bone island. No focal muscular atrophy or edema is identified
. No radiopaque foreign body identified.

 

IMPRESSION:

Acute medial malleolar fracture with intra-articular extension as described above.